# Patient Record
Sex: FEMALE | URBAN - METROPOLITAN AREA
[De-identification: names, ages, dates, MRNs, and addresses within clinical notes are randomized per-mention and may not be internally consistent; named-entity substitution may affect disease eponyms.]

---

## 2018-08-16 ENCOUNTER — IMPORTED ENCOUNTER (OUTPATIENT)
Dept: URBAN - METROPOLITAN AREA CLINIC 9 | Facility: CLINIC | Age: 58
End: 2018-08-16

## 2021-01-14 NOTE — PROCEDURE NOTE: CLINICAL
PROCEDURE NOTE: Biopsy of Eyelid Left Lower Lid. Diagnosis: Eyelid Lesion, Uncertain Behavior. Anesthesia: Local. Risks, benefits and alternatives discussed. Patient desires to proceed with biopsy today. A drop of proparacaine was instilled in the involved eye. Involved area was prepped using alcohol wipe and anesthetized using 1% lidocaine with epi. Lesion was excised using mini Iain scissors and forceps and placed in formalin to be sent for histopathology. Wound was cauterized to achieve hemostasis and erythromycin ophthalmic ointment was applied. The patient tolerated the procedure well and left in good condition. Post op instructions were given to the patient. The patient understands to call us for any concerns. Carley Carballo

## 2021-09-21 NOTE — PATIENT DISCUSSION
Glasses Prescription given to patient. PEM ATTENDING ADDENDUM  I reviewed the documentation initiated by the scribe, and made modifications as appropriate.  The note above represents my evaluation, exam, and medical decision making.  Anthony Kuo MD

## 2021-10-05 NOTE — PROCEDURE NOTE: CLINICAL
PROCEDURE NOTE: Punctal Plugs, Silicone Bilateral Lower Lids. Diagnosis: Dry Eye Syndrome. Anesthesia: Topical. Prep: Antibiotic Drops q 5min x 3. Prior to treatment, the risks/benefits/alternatives were discussed. The patient wished to proceed with procedure. One drop of proparacaine was placed and a drop of lidocaine gel was placed over the puncta. 0.* mm permanent silicone plugs were inserted in * eyelids. Patient tolerated procedure well. There were no complications. Post procedure instructions given. Joel Poe

## 2021-10-16 ASSESSMENT — TONOMETRY
OD_IOP_MMHG: 12
OS_IOP_MMHG: 13

## 2021-10-16 ASSESSMENT — VISUAL ACUITY
OD_SC: 20/50 + SN
OS_CC: 20/25 SN
OD_CC: 20/25 SN
OS_SC: 20/50 + SN
OS_PH: 20/30 SN
OD_PH: 20/30 SN